# Patient Record
Sex: MALE | URBAN - METROPOLITAN AREA
[De-identification: names, ages, dates, MRNs, and addresses within clinical notes are randomized per-mention and may not be internally consistent; named-entity substitution may affect disease eponyms.]

---

## 2021-05-04 ENCOUNTER — LAB OUTSIDE AN ENCOUNTER (OUTPATIENT)
Dept: URBAN - METROPOLITAN AREA CLINIC 17 | Facility: CLINIC | Age: 51
End: 2021-05-04

## 2021-05-04 ENCOUNTER — OFFICE VISIT (OUTPATIENT)
Dept: URBAN - METROPOLITAN AREA CLINIC 17 | Facility: CLINIC | Age: 51
End: 2021-05-04
Payer: COMMERCIAL

## 2021-05-04 ENCOUNTER — WEB ENCOUNTER (OUTPATIENT)
Dept: URBAN - METROPOLITAN AREA CLINIC 17 | Facility: CLINIC | Age: 51
End: 2021-05-04

## 2021-05-04 DIAGNOSIS — Z12.11 SCREEN FOR COLON CANCER: ICD-10-CM

## 2021-05-04 DIAGNOSIS — K76.0 FATTY LIVER: ICD-10-CM

## 2021-05-04 DIAGNOSIS — E66.9 OBESITY (BMI 30.0-34.9): ICD-10-CM

## 2021-05-04 DIAGNOSIS — R74.8 ELEVATED LIVER ENZYMES: ICD-10-CM

## 2021-05-04 DIAGNOSIS — R16.0 HEPATOMEGALY: ICD-10-CM

## 2021-05-04 PROBLEM — 197321007: Status: ACTIVE | Noted: 2021-05-04

## 2021-05-04 PROBLEM — 443371000124107: Status: ACTIVE | Noted: 2021-05-04

## 2021-05-04 PROCEDURE — 99204 OFFICE O/P NEW MOD 45 MIN: CPT | Performed by: INTERNAL MEDICINE

## 2021-05-04 NOTE — HPI-TODAY'S VISIT:
5/4/21 51 yo male pt of DR Jose Rodas. He usually gets a physical yearly and for the first time was noted to have elevated liver enzymes.  He gained about 20 lbs in the pandemic but has now lost most of that weight since he got  to his wife who is into health and fitness. He lost most of the weight since 3/27/21.  No personal h/o liver disease. Mother had hep B and scleroderma.  He does not have a h/o hepatitis. Has not had etoh in 3 months; prior to that a glass of wine every 3 weeks.  No h/o IVDU, no blood transfusions, no h/o jaundice.  Had some abdominal discomfort after eating pizza x 2. Had nausea and vomiting from being anxious prior to his wedding in March but none in the past  5 weeks.  He has not had his colonoscopy.

## 2021-05-07 LAB
ACTIN (SMOOTH MUSCLE) ANTIBODY: 11
ALBUMIN: 4.3
ALKALINE PHOSPHATASE: 150
ALPHA-1-ANTITRYPSIN, SERUM: 145
ALT (SGPT): 61
AST (SGOT): 47
BILIRUBIN, DIRECT: 0.11
BILIRUBIN, TOTAL: 0.4
CERULOPLASMIN: 21.7
FERRITIN, SERUM: 164
HBSAG SCREEN: NEGATIVE
HEP A AB, IGM: NEGATIVE
HEP B CORE AB, IGM: NEGATIVE
HEP C VIRUS AB: <0.1
IRON BIND.CAP.(TIBC): 295
IRON SATURATION: 20
IRON: 59
MITOCHONDRIAL (M2) ANTIBODY: <20
PROTEIN, TOTAL: 7.6
UIBC: 236

## 2021-05-12 ENCOUNTER — WEB ENCOUNTER (OUTPATIENT)
Dept: URBAN - METROPOLITAN AREA CLINIC 17 | Facility: CLINIC | Age: 51
End: 2021-05-12

## 2021-06-07 ENCOUNTER — OFFICE VISIT (OUTPATIENT)
Dept: URBAN - METROPOLITAN AREA SURGERY CENTER 16 | Facility: SURGERY CENTER | Age: 51
End: 2021-06-07

## 2021-06-30 ENCOUNTER — DASHBOARD ENCOUNTERS (OUTPATIENT)
Age: 51
End: 2021-06-30

## 2021-07-06 ENCOUNTER — OFFICE VISIT (OUTPATIENT)
Dept: URBAN - METROPOLITAN AREA CLINIC 17 | Facility: CLINIC | Age: 51
End: 2021-07-06

## 2021-07-06 ENCOUNTER — TELEPHONE ENCOUNTER (OUTPATIENT)
Dept: URBAN - METROPOLITAN AREA CLINIC 17 | Facility: CLINIC | Age: 51
End: 2021-07-06

## 2021-07-07 ENCOUNTER — LAB OUTSIDE AN ENCOUNTER (OUTPATIENT)
Dept: URBAN - METROPOLITAN AREA CLINIC 17 | Facility: CLINIC | Age: 51
End: 2021-07-07

## 2021-07-09 ENCOUNTER — OFFICE VISIT (OUTPATIENT)
Dept: URBAN - METROPOLITAN AREA SURGERY CENTER 16 | Facility: SURGERY CENTER | Age: 51
End: 2021-07-09

## 2021-07-15 ENCOUNTER — LAB OUTSIDE AN ENCOUNTER (OUTPATIENT)
Dept: URBAN - METROPOLITAN AREA CLINIC 105 | Facility: CLINIC | Age: 51
End: 2021-07-15

## 2021-07-16 LAB
ALBUMIN: 4.5
ALKALINE PHOSPHATASE: 134
ALT (SGPT): 37
AST (SGOT): 38
BILIRUBIN, DIRECT: 0.19
BILIRUBIN, TOTAL: 0.8
PROTEIN, TOTAL: 7.6

## 2021-09-14 ENCOUNTER — OFFICE VISIT (OUTPATIENT)
Dept: URBAN - METROPOLITAN AREA CLINIC 17 | Facility: CLINIC | Age: 51
End: 2021-09-14

## 2021-09-17 ENCOUNTER — OFFICE VISIT (OUTPATIENT)
Dept: URBAN - METROPOLITAN AREA SURGERY CENTER 16 | Facility: SURGERY CENTER | Age: 51
End: 2021-09-17